# Patient Record
Sex: FEMALE | Race: BLACK OR AFRICAN AMERICAN | Employment: UNEMPLOYED | URBAN - METROPOLITAN AREA
[De-identification: names, ages, dates, MRNs, and addresses within clinical notes are randomized per-mention and may not be internally consistent; named-entity substitution may affect disease eponyms.]

---

## 2020-02-09 VITALS
TEMPERATURE: 97.6 F | BODY MASS INDEX: 30.53 KG/M2 | RESPIRATION RATE: 15 BRPM | DIASTOLIC BLOOD PRESSURE: 66 MMHG | HEIGHT: 66 IN | WEIGHT: 190 LBS | SYSTOLIC BLOOD PRESSURE: 105 MMHG | HEART RATE: 70 BPM | OXYGEN SATURATION: 99 %

## 2020-02-09 ASSESSMENT — PAIN SCALES - GENERAL: PAINLEVEL_OUTOF10: 7

## 2020-02-10 ENCOUNTER — HOSPITAL ENCOUNTER (EMERGENCY)
Age: 35
Discharge: HOME | End: 2020-02-10
Payer: COMMERCIAL

## 2020-02-10 LAB
A/G RATIO: 1.1 (ref 1.1–2.2)
ALBUMIN SERPL-MCNC: 3.6 G/DL (ref 3.4–5)
ALP BLD-CCNC: 86 U/L (ref 40–129)
ALT SERPL-CCNC: 18 U/L (ref 10–40)
ANION GAP SERPL CALCULATED.3IONS-SCNC: 13 MMOL/L (ref 3–16)
AST SERPL-CCNC: 17 U/L (ref 15–37)
BASOPHILS ABSOLUTE: 0 K/UL (ref 0–0.2)
BASOPHILS RELATIVE PERCENT: 0.6 %
BILIRUB SERPL-MCNC: 0.4 MG/DL (ref 0–1)
BILIRUBIN URINE: NEGATIVE
BLOOD, URINE: ABNORMAL
BUN BLDV-MCNC: 15 MG/DL (ref 7–20)
CALCIUM SERPL-MCNC: 8.7 MG/DL (ref 8.3–10.6)
CHLORIDE BLD-SCNC: 106 MMOL/L (ref 99–110)
CLARITY: ABNORMAL
CO2: 17 MMOL/L (ref 21–32)
COLOR: ABNORMAL
COMMENT UA: ABNORMAL
CREAT SERPL-MCNC: 0.6 MG/DL (ref 0.6–1.1)
CRYSTALS, UA: ABNORMAL /HPF
EOSINOPHILS ABSOLUTE: 0.3 K/UL (ref 0–0.6)
EOSINOPHILS RELATIVE PERCENT: 3.6 %
EPITHELIAL CELLS, UA: 2 /HPF (ref 0–5)
GFR AFRICAN AMERICAN: >60
GFR NON-AFRICAN AMERICAN: >60
GLOBULIN: 3.4 G/DL
GLUCOSE BLD-MCNC: 102 MG/DL (ref 70–99)
GLUCOSE URINE: NEGATIVE MG/DL
HCG QUALITATIVE: NEGATIVE
HCT VFR BLD CALC: 40.7 % (ref 36–48)
HEMOGLOBIN: 13.3 G/DL (ref 12–16)
HYALINE CASTS: 0 /LPF (ref 0–8)
KETONES, URINE: ABNORMAL MG/DL
LEUKOCYTE ESTERASE, URINE: NEGATIVE
LIPASE: 29 U/L (ref 13–60)
LYMPHOCYTES ABSOLUTE: 2 K/UL (ref 1–5.1)
LYMPHOCYTES RELATIVE PERCENT: 28.7 %
MCH RBC QN AUTO: 29.9 PG (ref 26–34)
MCHC RBC AUTO-ENTMCNC: 32.6 G/DL (ref 31–36)
MCV RBC AUTO: 91.7 FL (ref 80–100)
MICROSCOPIC EXAMINATION: YES
MONOCYTES ABSOLUTE: 0.4 K/UL (ref 0–1.3)
MONOCYTES RELATIVE PERCENT: 6 %
NEUTROPHILS ABSOLUTE: 4.2 K/UL (ref 1.7–7.7)
NEUTROPHILS RELATIVE PERCENT: 61.1 %
NITRITE, URINE: NEGATIVE
PDW BLD-RTO: 13.3 % (ref 12.4–15.4)
PH UA: 5.5 (ref 5–8)
PLATELET # BLD: 178 K/UL (ref 135–450)
PMV BLD AUTO: 8.5 FL (ref 5–10.5)
POTASSIUM REFLEX MAGNESIUM: 4.3 MMOL/L (ref 3.5–5.1)
PROTEIN UA: 30 MG/DL
RBC # BLD: 4.44 M/UL (ref 4–5.2)
RBC UA: 403 /HPF (ref 0–4)
SODIUM BLD-SCNC: 136 MMOL/L (ref 136–145)
SPECIFIC GRAVITY UA: >1.03 (ref 1–1.03)
TOTAL PROTEIN: 7 G/DL (ref 6.4–8.2)
URINE REFLEX TO CULTURE: ABNORMAL
URINE TYPE: ABNORMAL
UROBILINOGEN, URINE: 1 E.U./DL
WBC # BLD: 7 K/UL (ref 4–11)
WBC UA: 5 /HPF (ref 0–5)

## 2020-02-10 PROCEDURE — 85025 COMPLETE CBC W/AUTO DIFF WBC: CPT

## 2020-02-10 PROCEDURE — 80053 COMPREHEN METABOLIC PANEL: CPT

## 2020-02-10 PROCEDURE — 6370000000 HC RX 637 (ALT 250 FOR IP): Performed by: PHYSICIAN ASSISTANT

## 2020-02-10 PROCEDURE — 81001 URINALYSIS AUTO W/SCOPE: CPT

## 2020-02-10 PROCEDURE — 99283 EMERGENCY DEPT VISIT LOW MDM: CPT

## 2020-02-10 PROCEDURE — 83690 ASSAY OF LIPASE: CPT

## 2020-02-10 PROCEDURE — 84703 CHORIONIC GONADOTROPIN ASSAY: CPT

## 2020-02-10 RX ORDER — IBUPROFEN 800 MG/1
800 TABLET ORAL ONCE
Status: COMPLETED | OUTPATIENT
Start: 2020-02-10 | End: 2020-02-10

## 2020-02-10 RX ORDER — IBUPROFEN 800 MG/1
800 TABLET ORAL EVERY 8 HOURS PRN
Qty: 30 TABLET | Refills: 0 | Status: SHIPPED | OUTPATIENT
Start: 2020-02-10

## 2020-02-10 RX ADMIN — IBUPROFEN 800 MG: 800 TABLET, FILM COATED ORAL at 01:55

## 2020-02-10 ASSESSMENT — ENCOUNTER SYMPTOMS
SHORTNESS OF BREATH: 0
NAUSEA: 0
ABDOMINAL PAIN: 1
WHEEZING: 0
VOMITING: 0

## 2020-02-10 ASSESSMENT — PAIN SCALES - GENERAL: PAINLEVEL_OUTOF10: 7

## 2020-02-10 NOTE — ED PROVIDER NOTES
[address] [facility]        Pt Name: Sandra Daniels  MRN: [id number]  [place] 1985  Date of evaluation: 2/9/2020  Provider: Shaniqua Olsen  PCP: No primary care provider on file. This patient was not seen and evaluated by the attending physician No att. providers found. CHIEF COMPLAINT       Chief Complaint   Patient presents with    Abdominal Pain     lower abdominal pain since last month, getting worse. states her menstrual cycle has been abnormal.        HISTORY OF PRESENT ILLNESS   (Location/Symptom, Timing/Onset, Context/Setting, Quality, Duration, Modifying Factors, Severity)  Note limiting factors. Sandra Daniels is a 29 y.o. female patient presents emergency department for evaluation of frequent uterine bleeding. Patient states that her periods have shortened to every 20 days for the last 3 months. Patient states that her periods have become more painful and she has more frequent cramping. Patient states she has had 3 ectopic pregnancies in the past.  She has 1 living child. She states no complications with that particular pregnancy. Patient states she gets intermittent cramping-like pain that feels like squeezing in her lower abdomen. Patient states she has taken Aleve for her symptoms with some relief. Patient states she only gets these symptoms during her menses. Patient states she [place] here [place] 5 [place] ago and does not have an OB/GYN here. Patient denies feeling lightheaded or dizzy. She denies any headache, blurred vision, nausea vomiting or diarrhea. She denies any abnormal vaginal discharge. She denies any pain with intercourse. Nursing Notes were all reviewed and agreed with or any disagreements were addressed in the HPI. REVIEW OF SYSTEMS    (2-9 systems for level 4, 10 or more for level 5)     Review of Systems   Constitutional: Negative for fatigue and fever.    HENT:

## 2020-11-30 ENCOUNTER — HOSPITAL ENCOUNTER (EMERGENCY)
Age: 35
Discharge: HOME | End: 2020-11-30
Attending: EMERGENCY MEDICINE
Payer: COMMERCIAL

## 2020-11-30 ENCOUNTER — APPOINTMENT (OUTPATIENT)
Dept: GENERAL RADIOLOGY | Age: 35
End: 2020-11-30
Payer: COMMERCIAL

## 2020-11-30 VITALS
RESPIRATION RATE: 16 BRPM | SYSTOLIC BLOOD PRESSURE: 118 MMHG | HEIGHT: 66 IN | WEIGHT: 201 LBS | DIASTOLIC BLOOD PRESSURE: 77 MMHG | TEMPERATURE: 98.6 F | BODY MASS INDEX: 32.3 KG/M2 | HEART RATE: 71 BPM | OXYGEN SATURATION: 100 %

## 2020-11-30 LAB
BILIRUBIN URINE: NEGATIVE
BLOOD, URINE: NEGATIVE
CLARITY: CLEAR
COLOR: YELLOW
GLUCOSE URINE: NEGATIVE MG/DL
HCG(URINE) PREGNANCY TEST: NEGATIVE
KETONES, URINE: NEGATIVE MG/DL
LEUKOCYTE ESTERASE, URINE: NEGATIVE
MICROSCOPIC EXAMINATION: NORMAL
NITRITE, URINE: NEGATIVE
PH UA: 5.5 (ref 5–8)
PROTEIN UA: NEGATIVE MG/DL
SPECIFIC GRAVITY UA: >1.03 (ref 1–1.03)
URINE REFLEX TO CULTURE: NORMAL
URINE TYPE: NORMAL
UROBILINOGEN, URINE: 1 E.U./DL

## 2020-11-30 PROCEDURE — 6360000002 HC RX W HCPCS: Performed by: EMERGENCY MEDICINE

## 2020-11-30 PROCEDURE — 6370000000 HC RX 637 (ALT 250 FOR IP): Performed by: EMERGENCY MEDICINE

## 2020-11-30 PROCEDURE — 81003 URINALYSIS AUTO W/O SCOPE: CPT

## 2020-11-30 PROCEDURE — 84703 CHORIONIC GONADOTROPIN ASSAY: CPT

## 2020-11-30 PROCEDURE — 96372 THER/PROPH/DIAG INJ SC/IM: CPT

## 2020-11-30 PROCEDURE — 99283 EMERGENCY DEPT VISIT LOW MDM: CPT

## 2020-11-30 PROCEDURE — 72100 X-RAY EXAM L-S SPINE 2/3 VWS: CPT

## 2020-11-30 RX ORDER — LIDOCAINE 4 G/G
1 PATCH TOPICAL DAILY
Status: DISCONTINUED | OUTPATIENT
Start: 2020-11-30 | End: 2020-11-30 | Stop reason: HOSPADM

## 2020-11-30 RX ORDER — LIDOCAINE 4 G/G
1 PATCH TOPICAL DAILY
Status: DISCONTINUED | OUTPATIENT
Start: 2020-11-30 | End: 2020-11-30

## 2020-11-30 RX ORDER — LIDOCAINE 50 MG/G
1 PATCH TOPICAL DAILY
Qty: 10 PATCH | Refills: 0 | Status: SHIPPED | OUTPATIENT
Start: 2020-11-30 | End: 2020-12-10

## 2020-11-30 RX ORDER — ORPHENADRINE CITRATE 30 MG/ML
60 INJECTION INTRAMUSCULAR; INTRAVENOUS ONCE
Status: COMPLETED | OUTPATIENT
Start: 2020-11-30 | End: 2020-11-30

## 2020-11-30 RX ORDER — CYCLOBENZAPRINE HCL 10 MG
10 TABLET ORAL 3 TIMES DAILY PRN
Qty: 20 TABLET | Refills: 0 | Status: SHIPPED | OUTPATIENT
Start: 2020-11-30 | End: 2020-12-10

## 2020-11-30 RX ORDER — KETOROLAC TROMETHAMINE 30 MG/ML
60 INJECTION, SOLUTION INTRAMUSCULAR; INTRAVENOUS ONCE
Status: COMPLETED | OUTPATIENT
Start: 2020-11-30 | End: 2020-11-30

## 2020-11-30 RX ADMIN — ORPHENADRINE CITRATE 60 MG: 30 INJECTION INTRAMUSCULAR; INTRAVENOUS at 04:51

## 2020-11-30 RX ADMIN — KETOROLAC TROMETHAMINE 60 MG: 30 INJECTION, SOLUTION INTRAMUSCULAR at 04:51

## 2020-11-30 ASSESSMENT — PAIN SCALES - GENERAL
PAINLEVEL_OUTOF10: 8
PAINLEVEL_OUTOF10: 8

## 2020-11-30 ASSESSMENT — PAIN DESCRIPTION - ORIENTATION: ORIENTATION: LOWER;MID

## 2020-11-30 ASSESSMENT — PAIN DESCRIPTION - LOCATION: LOCATION: BACK

## 2020-11-30 ASSESSMENT — PAIN DESCRIPTION - PAIN TYPE: TYPE: ACUTE PAIN

## 2020-11-30 NOTE — ED PROVIDER NOTES
[facility] Emergency Department    CHIEF COMPLAINT  Chief Complaint   Patient presents with    Lower Back Pain     middle lower back pain upon movement, (not tender to touch) x 1 week. denies injury       HISTORY OF PRESENT ILLNESS  Preston Sen is a 28 y.o. female  who presents to the ED complaining of mid-low back pain. Has been going on for a week. No falls or injuries noted. No radicular pains down the legs and no abdominal pains. No leg weakness. No dysuria or hematuria. No upper back pain or neck pain. No nausea/vomiting or diarrhea. Worse with movement. No loss of b/b function or retention of urine. No saddle anesthesia or numbness anywhere. She is unsure what is going on. She says she just woke up last week and it hurt. Denies chance at pregnancy. No VB or discharge, no fevers, no other complaints today. No CP cough or SOB. She states she has used special pillows and ibuprofen and aleve without relief. No relieving factors except movement. No other complaints, modifying factors or associated symptoms. I have reviewed the following from the nursing documentation. History reviewed. No pertinent past medical history. Past Surgical History:   Procedure Laterality Date    ABDOMINOPLASTY      ECTOPIC PREGNANCY SURGERY       History reviewed. No pertinent family history.   Social History     Socioeconomic History    Marital status: Single     Spouse name: Not on file    Number of children: Not on file    Years of education: Not on file    Highest education level: Not on file   Occupational History    Not on file   Social Needs    Financial resource strain: Not on file    Food insecurity     Worry: Not on file     Inability: Not on file    Transportation needs     Medical: Not on file     Non-medical: Not on file   Tobacco Use    Smoking status: Never Smoker    Smokeless tobacco: Never Used   Substance and Sexual Activity    Alcohol use: Not Currently    Drug use: Never    Sexual activity: Not on file   Lifestyle    Physical activity     Days per week: Not on file     Minutes per session: Not on file    Stress: Not on file   Relationships    Social connections     Talks on phone: Not on file     Gets together: Not on file     Attends Holiness service: Not on file     Active member of club or organization: Not on file     Attends meetings of clubs or organizations: Not on file     Relationship status: Not on file    Intimate partner violence     Fear of current or ex partner: Not on file     Emotionally abused: Not on file     Physically abused: Not on file     Forced sexual activity: Not on file   Other Topics Concern    Not on file   Social History Narrative    Not on file     Current Facility-Administered Medications   Medication Dose Route Frequency Provider Last Rate Last Dose    lidocaine 4 % external patch 1 patch  1 patch Transdermal Daily Polo Lynn MD   1 patch at 11/30/20 0450     Current Outpatient Medications   Medication Sig Dispense Refill    cyclobenzaprine (FLEXERIL) 10 MG tablet Take 1 tablet by mouth 3 times daily as needed for Muscle spasms (CAUTION: This medication can cause dizziness.  Do not drive or operate heavy machinery when on this medication.) 20 tablet 0    lidocaine (LIDODERM) 5 % Place 1 patch onto the skin daily for 10 days 12 hours on, 12 hours off. 10 patch 0    ibuprofen (ADVIL;MOTRIN) 800 MG tablet Take 1 tablet by mouth every 8 hours as needed for Pain 30 tablet 0     No Known Allergies    REVIEW OF SYSTEMS  10 systems reviewed, pertinent positives per HPI otherwise noted to be negative. PHYSICAL EXAM  /77   Pulse 71   Temp 98.6 °F (37 °C) (Tympanic)   Resp 16   Ht 5' 6\" (1.676 m)   Wt 201 lb (91.2 kg)   LMP 11/26/2020   SpO2 100%   BMI 32.44 kg/m²    GENERAL APPEARANCE: Awake and alert. Cooperative. No distress. HENT: Normocephalic. Atraumatic. Mucous membranes are moist.  NECK: Supple. EYES: PERRL. EOM's grossly intact. HEART/CHEST: RRR. No murmurs. No chest wall tenderness. LUNGS: Respirations unlabored. CTAB. Good air exchange. Speaking comfortably in full sentences. ABDOMEN: No tenderness. Soft. Non-distended. No masses. No organomegaly. No guarding or rebound. Normal bowel sounds throughout. BACK:      Cervical: no tenderness noted, no midline tenderness, no paraspinous spasm      Thoracic: no tenderness noted, no midline tenderness, no paraspinous spasm      Lumbar: no midline tenderness, mild bilat paraspinal spasm noted with mild ttp, but no inflammation. Neg SLR bilaterally. MUSCULOSKELETAL: No extremity edema. Compartments soft. No deformity. No tenderness in the extremities. All extremities neurovascularly intact. 2+ patellars bilat. Normal strength/sensation with the legs. SKIN: Warm and dry. No acute rashes. NEUROLOGICAL: Alert and oriented. CN's 2-12 intact. No gross facial drooping. Strength 5/5, sensation intact. 2 plus DTR's in knees bilaterally. Gait normal.  PSYCHIATRIC: Normal mood and affect. LABS  I have reviewed all labs for this visit.    Results for orders placed or performed during the hospital encounter of 11/30/20   Urinalysis Reflex to Culture    Specimen: Urine, clean catch   Result Value Ref Range    Color, UA YELLOW Straw/Yellow    Clarity, UA Clear Clear    Glucose, Ur Negative Negative mg/dL    Bilirubin Urine Negative Negative    Ketones, Urine Negative Negative mg/dL    Specific Gravity, UA >1.030 1.005 - 1.030    Blood, Urine Negative Negative    pH, UA 5.5 5.0 - 8.0    Protein, UA Negative Negative mg/dL    Urobilinogen, Urine 1.0 <2.0 E.U./dL    Nitrite, Urine Negative Negative    Leukocyte Esterase, Urine Negative Negative    Microscopic Examination Not Indicated     Urine Type NotGiven     Urine Reflex to Culture Not Indicated    Pregnancy, Urine   Result Value Ref Range    HCG(Urine) Pregnancy Test Negative Detects HCG level >20 MIU/mL RADIOLOGY    Xr Lumbar Spine (2-3 Views)    Result Date: 11/30/2020  EXAMINATION: THREE XRAY VIEWS OF THE LUMBAR SPINE 11/30/2020 5:21 am COMPARISON: None. HISTORY: ORDERING SYSTEM PROVIDED HISTORY: low back pain atraumatic TECHNOLOGIST PROVIDED HISTORY: Reason for exam:->low back pain atraumatic FINDINGS: Bones: Five non-rib-bearing lumbar vertebral bodies are present. Vertebral body heights and alignment are maintained. Degenerative changes: No significant degenerative changes. Soft Tissues: Normal soft tissues. No acute lumbar spine abnormality. ED COURSE/MDM  Patient seen and evaluated. Old records reviewed. Labs and imaging reviewed and results discussed with patient. After initial evaluation, differential diagnostic considerations included: abdominal aortic aneurysm, cauda equina syndrome, epidural mass lesion, spinal stenosis, herniated disk causing severe stenosis, sprain/strain, fracture, contusion, sciatica, UTI, pyelonephritis, kidney stone    The patient's ED workup was notable for bilat low back pain, atraumatic, with reassuring and benign exam.  XR neg for fractures. Hcg neg, UA neg. Feeling better with symptomatic management in ED. According to [facility] CMT for atraumatic low risk back pain there is no indication for CT or MRI today. F/u with PCP, return precautions discussed. No red flags for spinal cord compression syndrome or cauda equina. Treat symptomatically for home- continue her nsaids and adding flexeril/lidoderm patches. No signs of sciatica on exam to warrant steroids. F/u with PCP ([facility]) for reevaluation.     During the patient's ED course, the patient was given:  Medications   lidocaine 4 % external patch 1 patch (1 patch Transdermal Patch Applied 11/30/20 0450)   orphenadrine (NORFLEX) injection 60 mg (60 mg Intramuscular Given 11/30/20 0451)   ketorolac (TORADOL) injection 60 mg (60 mg Intramuscular Given 11/30/20 0451)        CLINICAL IMPRESSION  1. Acute bilateral low back pain without sciatica        Blood pressure 118/77, pulse 71, temperature 98.6 °F (37 °C), temperature source Tympanic, resp. rate 16, height 5' 6\" (1.676 m), weight 201 lb (91.2 kg), last menstrual period 11/26/2020, SpO2 100 %. DISPOSITION  Alicia Levin was discharged to home in stable condition. I have discussed the findings of today's workup with the patient and addressed the patient's questions and concerns. Important warning signs as well as new or worsening symptoms which would necessitate immediate return to the ED were discussed. The plan is to discharge from the ED at this time, and the patient is in stable condition. The patient acknowledged understanding is agreeable with this plan. Patient was given scripts for the following medications. I counseled patient how to take these medications. New Prescriptions    CYCLOBENZAPRINE (FLEXERIL) 10 MG TABLET    Take 1 tablet by mouth 3 times daily as needed for Muscle spasms (CAUTION: This medication can cause dizziness.  Do not drive or operate heavy machinery when on this medication.)    LIDOCAINE (LIDODERM) 5 %    Place 1 patch onto the skin daily for 10 days 12 hours on, 12 hours off. Follow-up with:  Your PCP ([facility])    Schedule an appointment as soon as possible for a visit in 1 week  For symptom re-evaluation    [facility] Emergency Department  [facility]  [address]  Go to   If symptoms worsen      DISCLAIMER: This chart was created using Dragon dictation software. Efforts were made by me to ensure accuracy, however some errors may be present due to limitations of this technology and occasionally words are not transcribed correctly.         Ricardo Dickey MD  11/30/20 0042

## 2020-12-11 ENCOUNTER — HOSPITAL ENCOUNTER (OUTPATIENT)
Dept: PHYSICAL THERAPY | Age: 35
Setting detail: THERAPIES SERIES
Discharge: HOME | End: 2020-12-11
Payer: COMMERCIAL

## 2020-12-11 NOTE — PROGRESS NOTES
[address] [facility]    Physical Therapy  Cancellation/No-show Note  Patient Name:  Axel Chilel  :  1985   Date:  2020    Cancelled visits to date: 1  No-shows to date: 0    For today's appointment patient:  [x]  Cancelled - eval   [x]  Rescheduled appointment - new appt on   []  No-show     Reason given by patient:  []  Patient ill  []  Conflicting appointment  []  No transportation    []  Conflict with work  []  No reason given  [x]  Other:     Comments: pt. Arrived 12 minutes late for appointment this date and was unable to be accommodated       Phone call information:   []  Phone call made today to patient at _ time at number provided:      []  Patient answered, conversation as follows:    []  Patient did not answer, message left as follows:  []  Phone call not made today  [x]  Phone call not needed - pt contacted us to cancel and provided reason for cancellation.      Electronically signed by:  Suze Momin PT

## 2020-12-14 ENCOUNTER — HOSPITAL ENCOUNTER (OUTPATIENT)
Dept: PHYSICAL THERAPY | Age: 35
Setting detail: THERAPIES SERIES
Discharge: HOME | End: 2020-12-14
Payer: COMMERCIAL

## 2020-12-14 ENCOUNTER — APPOINTMENT (OUTPATIENT)
Dept: PHYSICAL THERAPY | Age: 35
End: 2020-12-14
Payer: COMMERCIAL

## 2020-12-14 PROCEDURE — 97530 THERAPEUTIC ACTIVITIES: CPT

## 2020-12-14 PROCEDURE — 97140 MANUAL THERAPY 1/> REGIONS: CPT

## 2020-12-14 PROCEDURE — 97161 PT EVAL LOW COMPLEX 20 MIN: CPT

## 2020-12-14 NOTE — PLAN OF CARE
[facility], [address] [place], [place] [address], [address]  Phone: [phone]   Fax: [phone]     Physical Therapy Evaluation/Certification    Dear Referring Practitioner: July WALKER,    We had the pleasure of evaluating the following patient for physical therapy services at [address] [place] [address]. A summary of our findings can be found in the initial assessment below. This includes our plan of care. If you have any questions or concerns regarding these findings, please do not hesitate to contact me at the office phone number checked above. Thank you for the referral.       Physician Signature:_______________________________Date:__________________  By signing above (or electronic signature), therapists plan is approved by physician      Patient: Irene Stephenson   : 1985   MRN: [id number]  Referring Physician: Referring Practitioner: July WALKER      Evaluation Date: 2020      Medical Diagnosis Information:  Diagnosis: M54.42 (ICD-10-CM) - Lumbago with sciatica, left side   Treatment Diagnosis: LBP L>R; mal-alignments of pelvis and spine; decreased flexibility, decreased core strength                                         Insurance information: PT Insurance Information: [employer]/ No Copay/ No auth     Precautions/ Contra-indications: denies prior problems with LB  Latex Allergy:  [x]NO      []YES  Preferred Language for Healthcare:   [x]English       []other: Pt's primary language is [place] but when offered  she declined. She speaks adequate English for eval/treatment    SUBJECTIVE: Patient stated complaint: Pt reports she woke up one morning ~ 3wks ago with sig LBP and had difficulty moving.   About a week after that it got worse and she went to ED and they did 2 pain shots in buttocks but it didn't help much but the oral meds (she thinks muscle relaxer) seems to be helping some. Pain started in lower lumbar but sometimes it radiates up back or to R or L lumbar or L glut. Some days she's ok but some days she can barely move. She has been unable to work the past few weeks due to LBP. Fear avoidance: I should not do physical activities that (might) make my pain worse   [x] True   [] False     Relevant Medical History: none  Functional Outcome: Oswestry: raw score = 26; dysfunction = 52%    Pain Scale: 5-6 /10 (at worst 8/10)  Easing factors: laying flat on back, sitting with pillow behind back  Provocative factors: bending, rotating spine, laying prone, lifting, increased time on feet (> an hour)     Type: []Constant   [x]Intermittent  []Radiating []Localized [x]other: sometimes throbbing/pulsing pain   Numbness/Tingling: sometimes L buttock    Occupation/School: full time     Living Status/Prior Level of Function: Prior to this injury / incident, pt was independent with ADLs and IADLs. Daughter helping with lotion her legs and back and don socks. Pt normally pt likes to run for exercise but has been unable.     OBJECTIVE:   Palpation: very tight lumbar paraspinals/QL ; TTP over L-QL and L SIJ    Functional Mobility/Transfers: independent/ antalgic    Posture: sig inc lumbar lordosis, R iliac crest appears higher    Gait: (include devices/WB status) independent ambulation, mild antalgia, slow abhishke    Bandages/Dressings/Incisions: NA    Dermatomes Normal Abnormal Comments   inguinal area (L1)  X     anterior mid-thigh (L2) X     distal ant thigh/med knee (L3) X     medial lower leg and foot (L4) X     lateral lower leg and foot (L5) X     posterior calf (S1) X     medial calcaneus (S2) X         Reflexes Normal Abnormal Comments   S1-2 Seated achilles   NONE TESTED   S1-2 Prone knee bend      L3-4 Patellar tendon      Clonus      Babinski          ROM  Comments   Lumbar Flex 70% ** returning to upright, no reversal of lordosis in flexion   Lumbar Ext 50%  *     ROM LEFT RIGHT Comments   Lumbar Side Bend 30%* 50%    Lumbar Rotation 50% * 30%    Hip Flexion   Hips WNL   Hip Abd      Hip ER   Painful on left when combined with hip flexion   Hip IR      Hip Extension      Knee Ext      Knee Flex      Hamstring Flex -30 -10    Piriformis Tight +* WNL                    Joint mobility: MAL-ALIGNMENTS: L-inflare innom, L ant innom, sig LOL torsion, mild L5 left rotation in flexion and extension   []Normal    [x]Hypo   []Hyper      Strength / Myotomes LEFT RIGHT Comments   Multifidus 3+ 4-    Transverse Ab 4- 4-    Hip Flexors (L1-2) 4 * in LB 4+ Grossly WNL in LE's   Hip Abductors 4 4+    Hip Extensors 4-  * 4-    Hip Internal Rotators 4+ 4+    Hip External Rotators 4+ 4+    Quads (L2-4) 5 5    Hamstrings  4 4+    Ankle Plantarflexion (S1-2) 4+ 4+    Ankle Dorsiflexion (L4-5) 5 5    Ankle Inversion      Ankle Eversion (S1-2) 5 5    Great Toe Extension (L5) 5 5            Neural dynamic tension testing Normal Abnormal Comments   Slump Test  - Degree of knee flexion:   ? Some pain in LBP with seated knee extension, not worse with slump   SLR       0-30  X   L-SLR produced LBP at 30 deg   30-70      Femoral nerve (L2-4) x         Orthopedic Special Tests:    Normal Abnormal N/A Comments   Toe walk   x      Heel Walk x      Fwd Bend-aberrant or innominate mvmt)  x     Trendelenburg       Kemps/Quadrant       Stork       OLVIN/Jose A       Hip scour       SLR  x  On left   Crossed SLR x      Supine to sit       Hip thrust       SI distraction/compression       PA/Spring       Prone Instability test       Prone knee bend       Sacral Spring/thrust                  [x] Patient history, allergies, meds reviewed. Medical chart reviewed. See intake form. Review Of Systems (ROS):  [x]Performed Review of systems (Integumentary, CardioPulmonary, Neurological) by intake and observation. Intake form has been scanned into medical record.  Patient has been instructed to contact their primary care physician regarding ROS issues if not already being addressed at this time. Co-morbidities/Complexities (which will affect course of rehabilitation):  [x]None           Arthritic conditions   []Rheumatoid arthritis (M05.9)  []Osteoarthritis (M19.91)   Cardiovascular conditions   []Hypertension (I10)  []Hyperlipidemia (E78.5)  []Angina pectoris (I20)  []Atherosclerosis (I70)   Musculoskeletal conditions   []Disc pathology   []Congenital spine pathologies   []Prior surgical intervention  []Osteoporosis (M81.8)  []Osteopenia (M85.8)   Endocrine conditions   []Hypothyroid (E03.9)  []Hyperthyroid Gastrointestinal conditions   []Constipation (T31.33)   Metabolic conditions   []Morbid obesity (E66.01)  []Diabetes type 1(E10.65) or 2 (E11.65)   []Neuropathy (G60.9)     Pulmonary conditions   []Asthma (J45)  []Coughing   []COPD (J44.9)   Psychological Disorders  []Anxiety (F41.9)  []Depression (F32.9)   []Other:   []Other:           Barriers to/and or personal factors that will affect rehab potential:              []Age  []Sex    []Smoker              []Motivation/Lack of Motivation                        []Co-Morbidities              []Cognitive Function, education/learning barriers              []Environmental, home barriers              [x]profession/work barriers  []past PT/medical experience  []other:  Justification: Pt's job involves / lifting/bending which may irritate her pain when she returns to work    Falls Risk Assessment (30 days):   [x] Falls Risk assessed and no intervention required. [] Falls Risk assessed and Patient requires intervention due to being higher risk   TUG score (>12s at risk):     [] Falls education provided, including         ASSESSMENT: Pt presents with LBP flare up of about 3 wks  She had decreased and painful ROM of lumbar (and T/S at times), (+) neural tension signs in L sciatic and mal-alignment contributing to muscles spasm. She also has some weakness in her core/gluts. Pt should benefit from skilled PT  Functional Impairments:     [x]Noted lumbar/proximal hip hypomobility   []Noted lumbosacral and/or generalized hypermobility   []Decreased Lumbosacral/hip/LE functional ROM   [x]Decreased core/proximal hip strength and neuromuscular control    []Decreased LE functional strength    []Abnormal reflexes/sensation/myotomal/dermatomal deficits  []Reduced balance/proprioceptive control    []other:      Functional Activity Limitations (from functional questionnaire and intake)   [x]Reduced ability to tolerate prolonged functional positions   [x]Reduced ability or difficulty with changes of positions or transfers between positions   []Reduced ability to maintain good posture and demonstrate good body mechanics with sitting, bending, and lifting   [x]Reduced ability to sleep   [x] Reduced ability or tolerance with driving and/or computer work   [x]Reduced ability to perform lifting, reaching, carrying tasks   []Reduced ability to squat   [x]Reduced ability to forward bend   [x]Reduced ability to ambulate prolonged functional periods/distances/surfaces   [x]Reduced ability to ascend/descend stairs   []other:       Participation Restrictions   [x]Reduced participation in self care activities   [x]Reduced participation in home management activities   [x]Reduced participation in work activities   []Reduced participation in social activities. [x]Reduced participation in sport/recreational activities. Classification:   []Signs/symptoms consistent with Lumbar instability/stabilization subgroup. [x]Signs/symptoms consistent with Lumbar mobilization/manipulation subgroup, myotomes and dermatomes intact. Meets manipulation criteria.     []Signs/symptoms consistent with Lumbar direction specific/centralization subgroup   []Signs/symptoms consistent with Lumbar traction subgroup     [x]Signs/symptoms consistent with lumbar facet dysfunction   []Signs/symptoms consistent with lumbar stenosis type dysfunction   []Signs/symptoms consistent with nerve root involvement including myotome & dermatome dysfunction   []Signs/symptoms consistent with post-surgical status including: decreased ROM, strength and function. []signs/symptoms consistent with pathology which may benefit from Dry needling     []other:      Prognosis/Rehab Potential:      []Excellent   []Good    []Fair   []Poor    Tolerance of evaluation/treatment:    []Excellent   [x]Good    []Fair   []Poor     Physical Therapy Evaluation Complexity Justification  [x] A history of present problem with:  [] no personal factors and/or comorbidities that impact the plan of care;  [x]1-2 personal factors and/or comorbidities that impact the plan of care  []3 personal factors and/or comorbidities that impact the plan of care  [x] An examination of body systems using standardized tests and measures addressing any of the following: body structures and functions (impairments), activity limitations, and/or participation restrictions;:  [x] a total of 1-2 or more elements   [] a total of 3 or more elements   [] a total of 4 or more elements   [x] A clinical presentation with:  [x] stable and/or uncomplicated characteristics   [] evolving clinical presentation with changing characteristics  [] unstable and unpredictable characteristics;   [x] Clinical decision making of [x] low, [] moderate, [] high complexity using standardized patient assessment instrument and/or measurable assessment of functional outcome.     [x] EVAL (LOW) 08447 (typically 15 minutes face-to-face)  [] EVAL (MOD) 58084 (typically 30 minutes face-to-face)  [] EVAL (HIGH) 63728 (typically 45 minutes face-to-face)  [] RE-EVAL     PLAN: Begin PT focusing on: proximal hip mobilizations, LB mobs, LB core activation, proximal hip activation, and HEP    Frequency/Duration:  2 days per week for  6 Weeks:  Interventions:  [x]  Therapeutic exercise including: strength training, ROM, for LE, Glutes and core   [x]  NMR activation and proprioception for glutes , LE and Core   [x]  Manual therapy as indicated for Hip complex, LE and spine to include: Dry Needling/IASTM, STM, PROM, Gr I-IV mobilizations, manipulation. [x]  Modalities as needed that may include: thermal agents, E-stim, Biofeedback, US, iontophoresis as indicated  [x]  Patient education on joint protection, postural re-education, activity modification, progression of HEP. HEP instruction: Written HEP instructions provided and reviewed. GOALS:  Patient stated goal: resume running and working without increased LBP  [] Progressing: [] Met: [] Not Met: [] Adjusted    Therapist goals for Patient:   Short Term Goals: To be achieved in: 2-3 weeks  1. Independent in HEP and progression per patient tolerance, in order to prevent re-injury. [] Progressing: [] Met: [] Not Met: [] Adjusted  2. Patient will have a 25% decrease in pain to facilitate improvement in movement, function, and ADLs as indicated by Functional Deficits. [] Progressing: [] Met: [] Not Met: [] Adjusted    Long Term Goals: To be achieved in: 6 weeks  1. Disability index score of 20% or less for the CHRIS to assist with reaching prior level of function. [] Progressing: [] Met: [] Not Met: [] Adjusted  2. Patient will demonstrate increased AROM of lumbar region to WNL, good LS mobility, good hip ROM to allow for improved ease of bed mobility, transfers and bending  [] Progressing: [] Met: [] Not Met: [] Adjusted  3. Patient will demonstrate an increase in Strength to 4+ proximal hip and core activation to allow for proper functional mobility as indicated by patients Functional Deficits. [] Progressing: [] Met: [] Not Met: [] Adjusted  4. 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 [] Progressing: [] Met: [] Not Met: [] Adjusted  5.  Pt will be able to resume her running program 1-2x/wk with LBP </= to 3/10  [] Progressing: [] Met: [] Not Met: [] Adjusted     Electronically signed by:   Adelaida Hagan PT

## 2020-12-15 NOTE — FLOWSHEET NOTE
[facility]  Phone: [phone]   Fax: [phone]    Physical Therapy Treatment Note/ Progress Report:     Date:  2020    Patient Name:  Michael Valdivia    :  1985  MRN: [id number]  Restrictions/Precautions:    Medical/Treatment Diagnosis Information:  · Diagnosis: M54.42 (ICD-10-CM) - Lumbago with sciatica, left side  · Treatment Diagnosis: LBP L>R; mal-alignments of pelvis and spine; decreased flexibility, decreased core strength  Insurance/Certification information:  PT Insurance Information: [employer]/ No Copay/ No auth  Physician Information:  Referring Practitioner: Antonia WALKER  Plan of care signed (Y/N): []  Yes   [x]  No-faxed      Date of Patient follow up with Physician:      Progress Report: []  Yes  [x]  No     Date Range for reporting period:  Beginnin20  Ending:      Progress report due (10 Rx/or 30 days whichever is less): 42    Recertification due (POC duration/ or 90 days whichever is less): 20    Visit # Insurance Allowable Auth required? Date Range    30 []  Yes  [x]  No pcy     Latex Allergy:  [x]NO      []YES  Preferred Language for Healthcare:   [x]English       []other: speaks [place] but speaks adequate English to do sessions without .     Functional Scale:  oswestry      Date assessed:  LEFS: raw score = 26; dysfunction = 52%    20    Pain level:  5-6/10 (up to 8/10 at worst)    SUBJECTIVE:  See eval    OBJECTIVE: See eval      RESTRICTIONS/PRECAUTIONS: none    Exercises/Interventions:     Therapeutic Exercise (61680)  Resistance / level Sets/time Reps Notes / Cues   bike       HSS on step       HFS on step       TA iso + PPT       Piriformis stretch       Active sciatic nerve slide                                                 Therapeutic Activities (69216)       See patient education below  10'                          Neuromuscular Re-ed (14864) Core stabs                                   Manual Intervention ([id number]) x 17'       MET to correct: L inflare  L ant innom  LOL torsion 15'     LAD  LLE 30\" 2    Positional release       STM/ IASTM                         Pt. Education:  12/14/20: Educated patient on how posture and alignment are contributing to her pain. Discussed positions of support with pillows to try to decrease LBP in static positions. -pt educated on diagnosis, prognosis and expectations for rehab  -all pt questions were answered    Home Exercise Program:  npv-no time today    Therapeutic Exercise and NMR EXR  [] (18179) Provided verbal/tactile cueing for activities related to strengthening, flexibility, endurance, ROM for improvements in LE, proximal hip, and core control with self care, mobility, lifting, ambulation.  [] (19817) Provided verbal/tactile cueing for activities related to improving balance, coordination, kinesthetic sense, posture, motor skill, proprioception  to assist with LE, proximal hip, and core control in self care, mobility, lifting, ambulation and eccentric single leg control.   [] (86095) Therapist is in constant attendance of 2 or more patients providing skilled therapy interventions, but not providing any significant amount of measurable one-on-one time to either patient, for improvements in LE, proximal hip, and core control in self care, mobility, lifting, ambulation and eccentric single leg control.      NMR and Therapeutic Activities:    [x] (65555 or 44176) Provided verbal/tactile cueing for activities related to improving balance, coordination, kinesthetic sense, posture, motor skill, proprioception and motor activation to allow for proper function of core, proximal hip and LE with self care and ADLs  [] (91372) Gait Re-education- Provided training and instruction to the patient for proper LE, core and proximal hip recruitment and positioning and eccentric body weight control with ambulation re-education including up and down stairs     Home Exercise Program:    [] (62664) Reviewed/Progressed HEP activities related to strengthening, flexibility, endurance, ROM of core, proximal hip and LE for functional self-care, mobility, lifting and ambulation/stair navigation   [] (08506)Reviewed/Progressed HEP activities related to improving balance, coordination, kinesthetic sense, posture, motor skill, proprioception of core, proximal hip and LE for self care, mobility, lifting, and ambulation/stair navigation      Manual Treatments:  PROM / STM / Oscillations-Mobs:  G-I, II, III, IV (PA's, Inf., Post.)  [x] (69694) Provided manual therapy to mobilize LE, proximal hip and/or LS spine soft tissue/joints for the purpose of modulating pain, promoting relaxation,  increasing ROM, reducing/eliminating soft tissue swelling/inflammation/restriction, improving soft tissue extensibility and allowing for proper ROM for normal function with self care, mobility, lifting and ambulation. Modalities:  [] (84261) Vasopneumatic compression: Utilized vasopneumatic compression to decrease edema / swelling for the purpose of improving mobility and quad tone / recruitment which will allow for increased overall function including but not limited to self-care, transfers, ambulation, and ascending / descending stairs.        Modalities:  Consider MHP or CP or E-stim  May be appropriate for DN if other manual techniques are not enough  Charges:  Timed Code Treatment Minutes: 27   Total Treatment Minutes: 50     [x] EVAL - LOW (72117)   [] EVAL - MOD (27449)  [] EVAL - HIGH (78557)  [] RE-EVAL (37583)  [] UK(10428) x       [] Ionto  [] NMR (13888) x       [] Vaso  [x] Manual (44211) x  1     [] Ultrasound  [x] TA x   1     [] Mech Traction (50833)  [] Aquatic Therapy x      [] ES (un) (61159):   [] Home Management Training x  [] ES(attended) (13709)   [] Dry Needling 1-2 muscles (91117):  [] Dry Needling 3+ muscles (627607  [] Group: progress  [] Patient is not progressing as expected and requires additional follow up with physician  [] Other    Persisting Functional Limitations/Impairments:  [x]Sitting [x]Standing   [x]Walking []Stairs   [x]Transfers [x]ADLs   [x]Squatting/bending []Kneeling  [x]Housework [x]Job related tasks  [x]Driving [x]Sports/Recreation   []Sleeping []Other:    ASSESSMENT:  AT EVAL 12/14: Pt presents with LBP flare up of about 3 wks  She had decreased and painful ROM of lumbar (and T/S at times), (+) neural tension signs in L sciatic and mal-alignment contributing to muscles spasm. She also has some weakness in her core/gluts. also decreased flexibility of L piriformis, L HS. Pt should benefit from skilled PT    Functional Impairments:    Treatment/Activity Tolerance:  [x] Pt able to complete treatment [] Patient limited by fatique  [] Patient limited by pain  [] Patient limited by other medical complications  [] Other:     Prognosis:  [x] Good [] Fair  [] Poor    Patient Requires Follow-up: [x] Yes  [] No     Comments:            PLAN: See eval. PT 2x / week for 6 weeks. [] Continue per plan of care [] Alter current plan (see comments)  [x] Plan of care initiated [] Hold pending MD visit [] Discharge    Electronically signed by: Concepcion Pardo PT, DPT      Note: If patient does not return for scheduled/ recommended follow up visits, this note will serve as a discharge from care along with most recent update on progress.

## 2020-12-21 ENCOUNTER — HOSPITAL ENCOUNTER (OUTPATIENT)
Dept: PHYSICAL THERAPY | Age: 35
Setting detail: THERAPIES SERIES
Discharge: HOME | End: 2020-12-21
Payer: COMMERCIAL

## 2020-12-21 NOTE — PROGRESS NOTES
conversation as follows: patient states she is feeling no pain and will not need any further PT; patient discharged from PT at this time   []  Patient did not answer, message left as follows:  []  Phone call not made today   []  Phone call not needed - pt contacted us to cancel and provided reason for cancellation.      Electronically signed by:  Claudia Clemente, PT

## 2020-12-23 ENCOUNTER — APPOINTMENT (OUTPATIENT)
Dept: PHYSICAL THERAPY | Age: 35
End: 2020-12-23
Payer: COMMERCIAL